# Patient Record
Sex: MALE | Race: WHITE | NOT HISPANIC OR LATINO | Employment: STUDENT | URBAN - METROPOLITAN AREA
[De-identification: names, ages, dates, MRNs, and addresses within clinical notes are randomized per-mention and may not be internally consistent; named-entity substitution may affect disease eponyms.]

---

## 2023-10-02 DIAGNOSIS — F90.2 ATTENTION DEFICIT HYPERACTIVITY DISORDER (ADHD), COMBINED TYPE: ICD-10-CM

## 2023-10-02 RX ORDER — LISDEXAMFETAMINE DIMESYLATE 50 MG/1
50 CAPSULE ORAL EVERY MORNING
Qty: 30 CAPSULE | Refills: 0 | Status: SHIPPED | OUTPATIENT
Start: 2023-10-02 | End: 2023-11-14 | Stop reason: SDUPTHER

## 2023-10-02 RX ORDER — LISDEXAMFETAMINE DIMESYLATE 50 MG/1
50 CAPSULE ORAL EVERY MORNING
COMMUNITY
Start: 2023-10-01 | End: 2023-10-02 | Stop reason: SDUPTHER

## 2023-11-14 DIAGNOSIS — F90.2 ATTENTION DEFICIT HYPERACTIVITY DISORDER (ADHD), COMBINED TYPE: ICD-10-CM

## 2023-11-14 RX ORDER — LISDEXAMFETAMINE DIMESYLATE 50 MG/1
50 CAPSULE ORAL EVERY MORNING
Qty: 30 CAPSULE | Refills: 0 | Status: SHIPPED | OUTPATIENT
Start: 2023-11-14 | End: 2023-11-17 | Stop reason: SDUPTHER

## 2023-11-17 RX ORDER — LISDEXAMFETAMINE DIMESYLATE 50 MG/1
50 CAPSULE ORAL EVERY MORNING
Qty: 30 CAPSULE | Refills: 0 | Status: SHIPPED | OUTPATIENT
Start: 2023-11-17 | End: 2023-11-29

## 2023-11-29 ENCOUNTER — TELEMEDICINE (OUTPATIENT)
Dept: BEHAVIORAL HEALTH | Facility: CLINIC | Age: 23
End: 2023-11-29
Payer: COMMERCIAL

## 2023-11-29 DIAGNOSIS — F41.1 GAD (GENERALIZED ANXIETY DISORDER): ICD-10-CM

## 2023-11-29 DIAGNOSIS — F90.2 ATTENTION DEFICIT HYPERACTIVITY DISORDER (ADHD), COMBINED TYPE: ICD-10-CM

## 2023-11-29 DIAGNOSIS — F42.2 MIXED OBSESSIONAL THOUGHTS AND ACTS: ICD-10-CM

## 2023-11-29 DIAGNOSIS — F33.1 MODERATE EPISODE OF RECURRENT MAJOR DEPRESSIVE DISORDER (MULTI): ICD-10-CM

## 2023-11-29 PROCEDURE — 99214 OFFICE O/P EST MOD 30 MIN: CPT | Performed by: STUDENT IN AN ORGANIZED HEALTH CARE EDUCATION/TRAINING PROGRAM

## 2023-11-29 RX ORDER — ATOMOXETINE 80 MG/1
1 CAPSULE ORAL DAILY
COMMUNITY
Start: 2020-05-11 | End: 2023-11-29 | Stop reason: SDUPTHER

## 2023-11-29 RX ORDER — LISDEXAMFETAMINE DIMESYLATE 50 MG/1
50 CAPSULE ORAL EVERY MORNING
Qty: 30 CAPSULE | Refills: 0 | Status: SHIPPED | OUTPATIENT
Start: 2023-11-29 | End: 2023-11-29 | Stop reason: SDUPTHER

## 2023-11-29 RX ORDER — BUSPIRONE HYDROCHLORIDE 30 MG/1
30 TABLET ORAL 2 TIMES DAILY
COMMUNITY
End: 2023-11-29 | Stop reason: SDUPTHER

## 2023-11-29 RX ORDER — LISDEXAMFETAMINE DIMESYLATE 60 MG/1
60 CAPSULE ORAL EVERY MORNING
Qty: 30 CAPSULE | Refills: 0 | Status: SHIPPED | OUTPATIENT
Start: 2023-11-29 | End: 2023-12-05 | Stop reason: SDUPTHER

## 2023-11-29 RX ORDER — CLONIDINE HYDROCHLORIDE 0.2 MG/1
0.2 TABLET ORAL DAILY
COMMUNITY
End: 2023-11-29 | Stop reason: SDUPTHER

## 2023-11-29 RX ORDER — FLUVOXAMINE MALEATE 100 MG/1
150 TABLET, COATED ORAL 2 TIMES DAILY
Qty: 270 TABLET | Refills: 3 | Status: SHIPPED | OUTPATIENT
Start: 2023-11-29 | End: 2023-12-05 | Stop reason: SDUPTHER

## 2023-11-29 RX ORDER — ATOMOXETINE 80 MG/1
80 CAPSULE ORAL DAILY
Qty: 90 CAPSULE | Refills: 3 | Status: SHIPPED | OUTPATIENT
Start: 2023-11-29 | End: 2024-11-28

## 2023-11-29 RX ORDER — CLONIDINE HYDROCHLORIDE 0.2 MG/1
0.2 TABLET ORAL DAILY
Qty: 90 TABLET | Refills: 3 | Status: SHIPPED | OUTPATIENT
Start: 2023-11-29 | End: 2024-05-22 | Stop reason: SDUPTHER

## 2023-11-29 RX ORDER — BUSPIRONE HYDROCHLORIDE 30 MG/1
30 TABLET ORAL 2 TIMES DAILY
Qty: 180 TABLET | Refills: 3 | Status: SHIPPED | OUTPATIENT
Start: 2023-11-29 | End: 2024-11-28

## 2023-11-29 NOTE — PROGRESS NOTES
"Subjective    All Individuals Present: Patient and Provider (Encounter Provider)     ID: Bola Young is a 23 y.o. male with ADHD, CANDELARIO, OCD, and MDD.     Interval History/HPI/PFSH:  Has final exams next week, and has been difficulty getting Vyvanse d/t national shortage, and has been out for 2 weeks. Stressed by impact this has had during key academic period. Motivation has been particularly difficult. Grades have still been mostly A's.    Started using CPAP and waiting to see full benefit, feels less seasonal depression and mental slowness. Has also used SAD lamp although harder in past 2 weeks to remember without stimulant.     Medication side effects: None Reported     Review of Systems  Constitutional: Negative  Psychiatric: Positive for anxiety, fatigue, school difficulties, and sleep disturbance, Negative for depression, irritability, loss of interest in favorite activities, and mood swings  Neurological: Negative   Other: N/A    Objective   There were no vitals taken for this visit.  Wt Readings from Last 4 Encounters:   03/29/21 78.7 kg (173 lb 6.4 oz)   03/11/20 74.4 kg (164 lb)   02/24/20 34.4 kg (75 lb 12.7 oz)       Mental Status Exam  General Appearance: Well groomed, appropriate eye contact.  Attitude/Behavior: Cooperative, conversant, engaged, and with good eye contact.  Motor: Psychomotor agitation. and more rocking  Speech: Normal rate, volume, prosody  Gait/Station: Within normal limits  Mood: \"hard time motivating\".  Affect: Constricted, Anxious, and Congruent with mood and topic of conversation  Thought Process: Circumstantial  Thought Associations: No loosening of associations  Thought Content: normal  Sensorium: Alert and oriented to person, place, time and situation  Insight: Intact, as evidenced by awareness of symptom patterns  Judgment: Intact  Cognition: Attention: Mild impairment in attention, Fund of Knowledge: Good, Language: Naming intact, Orientation: AOx4, Recent memory: intact, " and Remote Memory: intact  Testing: N/A., see prior neuropsych testing    Laboratory/Imaging/Diagnostic Tests       Assessment/Plan   Overall Formulation and Differential Diagnosis:  Bola Young is a 23 y.o. male who meets criteria for ADHD, CANDELARIO, MDD, and OCD.    Interval Assessment:  In the interim, worsening intrusive thoughts and will cross titrate from sertraline to fluvoxamine. Will resume stimulant cautiously. Now back in school and euthymic, but stress with stimulant shortage. Able to contract for safety.     Plan:  -continue atomoxetine 80 mg PO daily  -increase Vyvanse to 60 mg PO daily, switch to [employer]  --continue clonidine 0.2 mg PO QHS  -continue fluvoxamine 150 mg PO BID  -continue trazodone 100 mg PO QHS prn insomnia  -holding propranolol  -continue buspirone to 30 mg PO BID  -continue working with individual psychotherapist, get IS[facility]  -consider sleep study  -RTC 4-8 weeks    Risk Assessment:  Imminent Risk of Suicide or Serious Self-Injury: Low Risk -- Risk factors include: Depression and Family history of suicide Protective factors include:Denies current suicidal ideation, Denies history of suicide attempts , Future-oriented talk , Willingness to seek help and support , Cultural and Orthodox beliefs that discourage suicide and support self-preservation , Access to a variety of clinical interventions , Support through ongoing medical and mental healthcare relationships , Current/history of good response to treatment/meds , Interpersonal relationships and supports, e.g., family, friends, peers, community , and Restricted access to firearms or other lethal means of suicide   Imminent Risk of Violence or Homicide: Low Risk - Risk factors include: No significant risk factors identified on screening. Protective factors include: Lack of known history of harm to others , Lack of known history of violent ideation , Lack of known access to firearms , Affect regulation , and Positive, pro-social  family/peer network   Treatment Plan:  There are no recently modified care plans to display for this patient.      Attestation Statements   Number of Minutes Spent Performing Evaluation & Management (E&M): 30

## 2023-12-05 DIAGNOSIS — F42.2 MIXED OBSESSIONAL THOUGHTS AND ACTS: ICD-10-CM

## 2023-12-05 DIAGNOSIS — F90.2 ATTENTION DEFICIT HYPERACTIVITY DISORDER (ADHD), COMBINED TYPE: ICD-10-CM

## 2023-12-05 RX ORDER — FLUVOXAMINE MALEATE 100 MG/1
150 TABLET, COATED ORAL 2 TIMES DAILY
Qty: 270 TABLET | Refills: 3 | Status: SHIPPED | OUTPATIENT
Start: 2023-12-05 | End: 2024-12-04

## 2023-12-05 RX ORDER — LISDEXAMFETAMINE DIMESYLATE 30 MG/1
30 CAPSULE ORAL 2 TIMES DAILY
Qty: 60 CAPSULE | Refills: 0 | Status: SHIPPED | OUTPATIENT
Start: 2023-12-05 | End: 2024-01-29 | Stop reason: SDUPTHER

## 2023-12-05 RX ORDER — FLUVOXAMINE MALEATE 100 MG/1
150 TABLET, COATED ORAL 2 TIMES DAILY
Qty: 270 TABLET | Refills: 3 | Status: SHIPPED | OUTPATIENT
Start: 2023-12-05 | End: 2023-12-05 | Stop reason: SDUPTHER

## 2023-12-18 DIAGNOSIS — F33.1 MODERATE EPISODE OF RECURRENT MAJOR DEPRESSIVE DISORDER (MULTI): ICD-10-CM

## 2023-12-19 RX ORDER — TRAZODONE HYDROCHLORIDE 50 MG/1
50 TABLET ORAL NIGHTLY
Qty: 30 TABLET | Refills: 11 | Status: SHIPPED | OUTPATIENT
Start: 2023-12-19 | End: 2024-01-29 | Stop reason: SDUPTHER

## 2023-12-20 ENCOUNTER — TELEPHONE (OUTPATIENT)
Dept: BEHAVIORAL HEALTH | Facility: CLINIC | Age: 23
End: 2023-12-20
Payer: COMMERCIAL

## 2024-01-29 ENCOUNTER — TELEMEDICINE (OUTPATIENT)
Dept: BEHAVIORAL HEALTH | Facility: HOSPITAL | Age: 24
End: 2024-01-29
Payer: COMMERCIAL

## 2024-01-29 DIAGNOSIS — F41.1 GAD (GENERALIZED ANXIETY DISORDER): ICD-10-CM

## 2024-01-29 DIAGNOSIS — F42.2 MIXED OBSESSIONAL THOUGHTS AND ACTS: ICD-10-CM

## 2024-01-29 DIAGNOSIS — F33.1 MODERATE EPISODE OF RECURRENT MAJOR DEPRESSIVE DISORDER (MULTI): ICD-10-CM

## 2024-01-29 DIAGNOSIS — F90.2 ATTENTION DEFICIT HYPERACTIVITY DISORDER (ADHD), COMBINED TYPE: ICD-10-CM

## 2024-01-29 PROCEDURE — 99214 OFFICE O/P EST MOD 30 MIN: CPT | Performed by: STUDENT IN AN ORGANIZED HEALTH CARE EDUCATION/TRAINING PROGRAM

## 2024-01-29 RX ORDER — TRAZODONE HYDROCHLORIDE 100 MG/1
100-200 TABLET ORAL NIGHTLY
Qty: 180 TABLET | Refills: 3 | Status: SHIPPED | OUTPATIENT
Start: 2024-01-29 | End: 2024-03-28

## 2024-01-29 RX ORDER — LISDEXAMFETAMINE DIMESYLATE 60 MG/1
60 CAPSULE ORAL EVERY MORNING
Qty: 30 CAPSULE | Refills: 0 | Status: SHIPPED | OUTPATIENT
Start: 2024-01-29 | End: 2024-05-22 | Stop reason: SDUPTHER

## 2024-01-29 NOTE — PROGRESS NOTES
"Subjective    All Individuals Present: Patient and Provider (Encounter Provider)     ID: Bola Young is a 23 y.o. male with ADHD, CANDELARIO, OCD, and MDD.     Interval History/HPI/PFSH:    Sleep still problematic, has 1-2 nights per week where struggling to fall asleep. Still uses CPAP.    School going so-so this semester; finished last semester with straight A's. Has been smoking more marijuana. Trying to cut back,    Starts DBT group in February in addition to individual.     Medication side effects: None Reported     Review of Systems  Constitutional: Negative  Psychiatric: Positive for anxiety, fatigue, school difficulties, and sleep disturbance, Negative for depression, irritability, loss of interest in favorite activities, and mood swings  Neurological: Negative   Other: N/A    Objective   There were no vitals taken for this visit.  Wt Readings from Last 4 Encounters:   03/29/21 78.7 kg (173 lb 6.4 oz)   03/11/20 74.4 kg (164 lb)   02/24/20 34.4 kg (75 lb 12.7 oz)       Mental Status Exam  General Appearance: Well groomed, appropriate eye contact.  Attitude/Behavior: Cooperative, conversant, engaged, and with good eye contact.  Motor: Psychomotor agitation. and more rocking  Speech: Normal rate, volume, prosody  Gait/Station: Within normal limits  Mood: \"so so\".  Affect: Constricted, Anxious, and Congruent with mood and topic of conversation  Thought Process: Circumstantial  Thought Associations: No loosening of associations  Thought Content: normal  Sensorium: Alert and oriented to person, place, time and situation  Insight: Intact, as evidenced by awareness of symptom patterns  Judgment:  Some limitations re: increased cannabis usage  Cognition: Attention: Mild impairment in attention, Fund of Knowledge: Good, Language: Naming intact, Orientation: AOx4, Recent memory: intact, and Remote Memory: intact  Testing: N/A., see prior neuropsych testing    Laboratory/Imaging/Diagnostic Tests       Assessment/Plan "   Overall Formulation and Differential Diagnosis:  Bola Young is a 23 y.o. male who meets criteria for ADHD, CANDELARIO, MDD, and OCD.    Interval Assessment:  In the interim, worsening intrusive thoughts and will cross titrate from sertraline to fluvoxamine. Will resume stimulant cautiously. Now back in school and euthymic. Will focus on sleep. Able to contract for safety.     Plan:  -continue atomoxetine 80 mg PO daily  -continue Vyvanse to 60 mg PO daily  --continue clonidine 0.2 mg PO QHS  -continue fluvoxamine 150 mg PO BID  -increase trazodone to 100-200 mg PO QHS prn insomnia; consider increase in future if benefits depression  -continue CPAP  -holding propranolol  -continue buspirone to 30 mg PO BID  -continue working with individual psychotherapist  -RTC 4-8 weeks    Risk Assessment:  Imminent Risk of Suicide or Serious Self-Injury: Low Risk -- Risk factors include: Depression and Family history of suicide Protective factors include:Denies current suicidal ideation, Denies history of suicide attempts , Future-oriented talk , Willingness to seek help and support , Cultural and Latter day beliefs that discourage suicide and support self-preservation , Access to a variety of clinical interventions , Support through ongoing medical and mental healthcare relationships , Current/history of good response to treatment/meds , Interpersonal relationships and supports, e.g., family, friends, peers, community , and Restricted access to firearms or other lethal means of suicide   Imminent Risk of Violence or Homicide: Low Risk - Risk factors include: No significant risk factors identified on screening. Protective factors include: Lack of known history of harm to others , Lack of known history of violent ideation , Lack of known access to firearms , Affect regulation , and Positive, pro-social family/peer network   Treatment Plan:  There are no recently modified care plans to display for this patient.      Attestation  Statements   Number of Minutes Spent Performing Evaluation & Management (E&M): 30

## 2024-03-20 ENCOUNTER — TELEMEDICINE (OUTPATIENT)
Dept: BEHAVIORAL HEALTH | Facility: CLINIC | Age: 24
End: 2024-03-20
Payer: COMMERCIAL

## 2024-03-20 DIAGNOSIS — F42.2 MIXED OBSESSIONAL THOUGHTS AND ACTS: ICD-10-CM

## 2024-03-20 DIAGNOSIS — F90.2 ATTENTION DEFICIT HYPERACTIVITY DISORDER (ADHD), COMBINED TYPE: ICD-10-CM

## 2024-03-20 DIAGNOSIS — F41.1 GAD (GENERALIZED ANXIETY DISORDER): ICD-10-CM

## 2024-03-20 DIAGNOSIS — F33.1 MODERATE EPISODE OF RECURRENT MAJOR DEPRESSIVE DISORDER (MULTI): ICD-10-CM

## 2024-03-20 PROCEDURE — 99214 OFFICE O/P EST MOD 30 MIN: CPT | Performed by: STUDENT IN AN ORGANIZED HEALTH CARE EDUCATION/TRAINING PROGRAM

## 2024-03-20 RX ORDER — LISDEXAMFETAMINE DIMESYLATE 30 MG/1
30 CAPSULE ORAL 2 TIMES DAILY
Qty: 60 CAPSULE | Refills: 0 | Status: SHIPPED | OUTPATIENT
Start: 2024-04-19 | End: 2024-05-19

## 2024-03-20 RX ORDER — LISDEXAMFETAMINE DIMESYLATE 30 MG/1
30 CAPSULE ORAL 2 TIMES DAILY
Qty: 60 CAPSULE | Refills: 0 | Status: SHIPPED | OUTPATIENT
Start: 2024-03-20 | End: 2024-03-29 | Stop reason: SDUPTHER

## 2024-03-20 RX ORDER — LISDEXAMFETAMINE DIMESYLATE 30 MG/1
30 CAPSULE ORAL 2 TIMES DAILY
Qty: 60 CAPSULE | Refills: 0 | Status: SHIPPED | OUTPATIENT
Start: 2024-05-19 | End: 2024-06-18

## 2024-03-20 NOTE — PROGRESS NOTES
"Subjective    All Individuals Present: Patient and Provider (Encounter Provider)     ID: Bola Young is a 23 y.o. male with ADHD, CANDELARIO, OCD, and MDD.     Interval History/HPI/PFSH:    Had depressing spring break, was bored and frustrated with continued intrusive thoughts. Having a hard time not being so resistant to activities from therapy. Thinks that cannabis was likely exacerbating some of this and so taking a current break.    Working in therapy on resistance to change.    Denies SI, HI, AVH.     Medication side effects: None Reported     Review of Systems  Constitutional: Negative  Psychiatric: Positive for anxiety, fatigue, school difficulties, and sleep disturbance, Negative for depression, irritability, loss of interest in favorite activities, and mood swings  Neurological: Negative   Other: N/A    Objective   There were no vitals taken for this visit.  Wt Readings from Last 4 Encounters:   03/29/21 78.7 kg (173 lb 6.4 oz)   03/11/20 74.4 kg (164 lb)   02/24/20 34.4 kg (75 lb 12.7 oz)       Mental Status Exam  General Appearance: Well groomed, appropriate eye contact.  Attitude/Behavior: Cooperative, conversant, engaged, and with good eye contact.  Motor: Psychomotor agitation. and more rocking  Speech: Normal rate, volume, prosody  Gait/Station: Within normal limits  Mood: \"a little more depressed\"  Affect: Constricted, Anxious, and Congruent with mood and topic of conversation  Thought Process: Circumstantial  Thought Associations: No loosening of associations  Thought Content: normal  Sensorium: Alert and oriented to person, place, time and situation  Insight: Intact, as evidenced by awareness of symptom patterns  Judgment:  Some limitations re: increased cannabis usage  Cognition: Attention: Mild impairment in attention, Fund of Knowledge: Good, Language: Naming intact, Orientation: AOx4, Recent memory: intact, and Remote Memory: intact  Testing: N/A., see prior neuropsych " testing    Laboratory/Imaging/Diagnostic Tests       Assessment/Plan   Overall Formulation and Differential Diagnosis:  Bola Young is a 23 y.o. male who meets criteria for ADHD, CANDELARIO, MDD, and OCD.    Interval Assessment:  In the interim, worsening intrusive thoughts and will cross titrate from sertraline to fluvoxamine. Will resume stimulant cautiously. Now back in school, some increased depression with cannabis, now abstaining. Able to contract for safety.     Plan:  -continue atomoxetine 80 mg PO daily  -continue Vyvanse to 60 mg PO daily  --continue clonidine 0.2 mg PO QHS  -continue fluvoxamine 150 mg PO BID  -continue trazodone to 100-200 mg PO QHS prn insomnia; consider increase in future if benefits depression  -continue CPAP  -holding propranolol  -continue buspirone to 30 mg PO BID  -continue working with individual psychotherapist  -RTC 4-8 weeks    Risk Assessment:  Imminent Risk of Suicide or Serious Self-Injury: Low Risk -- Risk factors include: Depression and Family history of suicide Protective factors include:Denies current suicidal ideation, Denies history of suicide attempts , Future-oriented talk , Willingness to seek help and support , Cultural and Gnosticism beliefs that discourage suicide and support self-preservation , Access to a variety of clinical interventions , Support through ongoing medical and mental healthcare relationships , Current/history of good response to treatment/meds , Interpersonal relationships and supports, e.g., family, friends, peers, community , and Restricted access to firearms or other lethal means of suicide   Imminent Risk of Violence or Homicide: Low Risk - Risk factors include: No significant risk factors identified on screening. Protective factors include: Lack of known history of harm to others , Lack of known history of violent ideation , Lack of known access to firearms , Affect regulation , and Positive, pro-social family/peer network   Treatment  Plan:  There are no recently modified care plans to display for this patient.      Attestation Statements   Number of Minutes Spent Performing Evaluation & Management (E&M): 30

## 2024-03-28 RX ORDER — TRAZODONE HYDROCHLORIDE 100 MG/1
100 TABLET ORAL NIGHTLY
Qty: 90 TABLET | Refills: 3 | Status: SHIPPED | OUTPATIENT
Start: 2024-03-28 | End: 2025-03-28

## 2024-03-29 DIAGNOSIS — F90.2 ATTENTION DEFICIT HYPERACTIVITY DISORDER (ADHD), COMBINED TYPE: ICD-10-CM

## 2024-03-29 RX ORDER — LISDEXAMFETAMINE DIMESYLATE 30 MG/1
30 CAPSULE ORAL 2 TIMES DAILY
Qty: 60 CAPSULE | Refills: 0 | Status: SHIPPED | OUTPATIENT
Start: 2024-03-29 | End: 2024-04-28

## 2024-05-01 ENCOUNTER — APPOINTMENT (OUTPATIENT)
Dept: BEHAVIORAL HEALTH | Facility: CLINIC | Age: 24
End: 2024-05-01
Payer: COMMERCIAL

## 2024-05-22 ENCOUNTER — TELEMEDICINE (OUTPATIENT)
Dept: BEHAVIORAL HEALTH | Facility: CLINIC | Age: 24
End: 2024-05-22
Payer: COMMERCIAL

## 2024-05-22 DIAGNOSIS — F42.2 MIXED OBSESSIONAL THOUGHTS AND ACTS: ICD-10-CM

## 2024-05-22 DIAGNOSIS — F33.1 MODERATE EPISODE OF RECURRENT MAJOR DEPRESSIVE DISORDER (MULTI): ICD-10-CM

## 2024-05-22 DIAGNOSIS — F41.1 GAD (GENERALIZED ANXIETY DISORDER): ICD-10-CM

## 2024-05-22 DIAGNOSIS — F90.2 ATTENTION DEFICIT HYPERACTIVITY DISORDER (ADHD), COMBINED TYPE: ICD-10-CM

## 2024-05-22 PROCEDURE — 99214 OFFICE O/P EST MOD 30 MIN: CPT | Performed by: STUDENT IN AN ORGANIZED HEALTH CARE EDUCATION/TRAINING PROGRAM

## 2024-05-22 RX ORDER — CLONIDINE HYDROCHLORIDE 0.2 MG/1
0.2 TABLET ORAL DAILY
Qty: 90 TABLET | Refills: 3 | Status: SHIPPED | OUTPATIENT
Start: 2024-05-22 | End: 2025-05-22

## 2024-05-22 RX ORDER — LISDEXAMFETAMINE DIMESYLATE 60 MG/1
60 CAPSULE ORAL EVERY MORNING
Qty: 30 CAPSULE | Refills: 0 | Status: SHIPPED | OUTPATIENT
Start: 2024-05-22 | End: 2024-06-21

## 2024-05-22 NOTE — PROGRESS NOTES
"Subjective    All Individuals Present: Patient and Provider (Encounter Provider)     ID: Bola Young is a 23 y.o. male with ADHD, CANDELARIO, OCD, and MDD.     Interval History/HPI/PFSH:    Will be switching therapists to someone who specializes more with DBT, waiting for therapist to find this provider. Feels like things had gotten stagnant.    Doing well in school.    Trying to get a job for the summer. Goes back to school mid-August.    Trying to socialize more, get more into music.    Denies SI, HI, AVH.     Medication side effects: None Reported     Review of Systems  Constitutional: Negative  Psychiatric: Positive for anxiety, fatigue, school difficulties, and sleep disturbance, Negative for depression, irritability, loss of interest in favorite activities, and mood swings  Neurological: Negative   Other: N/A    Objective   There were no vitals taken for this visit.  Wt Readings from Last 4 Encounters:   03/29/21 78.7 kg (173 lb 6.4 oz)   03/11/20 74.4 kg (164 lb)   02/24/20 34.4 kg (75 lb 12.7 oz)       Mental Status Exam  General Appearance: Well groomed, appropriate eye contact.  Attitude/Behavior: Cooperative, conversant, engaged, and with good eye contact.  Motor: Psychomotor agitation. and more rocking  Speech: Normal rate, volume, prosody  Gait/Station: Within normal limits  Mood: \"okay\"  Affect: Constricted, Anxious, and Congruent with mood and topic of conversation  Thought Process: Circumstantial  Thought Associations: No loosening of associations  Thought Content: normal  Sensorium: Alert and oriented to person, place, time and situation  Insight: Intact, as evidenced by awareness of symptom patterns  Judgment:  Some limitations re: increased cannabis usage  Cognition: Attention: Mild impairment in attention, Fund of Knowledge: Good, Language: Naming intact, Orientation: AOx4, Recent memory: intact, and Remote Memory: intact  Testing: N/A., see prior neuropsych testing    Laboratory/Imaging/Diagnostic " Tests       Assessment/Plan   Overall Formulation and Differential Diagnosis:  Bola Young is a 23 y.o. male who meets criteria for ADHD, CANDELARIO, MDD, and OCD.    Interval Assessment:  In the interim, worsening intrusive thoughts and will cross titrate from sertraline to fluvoxamine. Will resume stimulant cautiously. Now back in school, some increased depression with cannabis, now abstaining. Able to contract for safety.     Plan:  -continue atomoxetine 80 mg PO daily  -continue Vyvanse to 60 mg PO daily  --continue clonidine 0.2 mg PO QHS  -continue fluvoxamine 150 mg PO BID  -continue trazodone to 100-200 mg PO QHS prn insomnia; consider increase in future if benefits depression  -continue CPAP  -holding propranolol  -continue buspirone to 30 mg PO BID  -continue working with individual psychotherapist, working on finding new DBT therapist  -RTC 4-8 weeks    Risk Assessment:  Imminent Risk of Suicide or Serious Self-Injury: Low Risk -- Risk factors include: Depression and Family history of suicide Protective factors include:Denies current suicidal ideation, Denies history of suicide attempts , Future-oriented talk , Willingness to seek help and support , Cultural and Jainism beliefs that discourage suicide and support self-preservation , Access to a variety of clinical interventions , Support through ongoing medical and mental healthcare relationships , Current/history of good response to treatment/meds , Interpersonal relationships and supports, e.g., family, friends, peers, community , and Restricted access to firearms or other lethal means of suicide   Imminent Risk of Violence or Homicide: Low Risk - Risk factors include: No significant risk factors identified on screening. Protective factors include: Lack of known history of harm to others , Lack of known history of violent ideation , Lack of known access to firearms , Affect regulation , and Positive, pro-social family/peer network   Treatment  Plan:  There are no recently modified care plans to display for this patient.      Attestation Statements   Number of Minutes Spent Performing Evaluation & Management (E&M): 30

## 2024-06-20 RX ORDER — SERTRALINE HYDROCHLORIDE 100 MG/1
TABLET, FILM COATED ORAL
Refills: 0 | OUTPATIENT
Start: 2024-06-20

## 2024-06-20 RX ORDER — FLUVOXAMINE MALEATE 50 MG/1
TABLET, FILM COATED ORAL
Refills: 0 | OUTPATIENT
Start: 2024-06-20

## 2024-07-03 ENCOUNTER — APPOINTMENT (OUTPATIENT)
Dept: BEHAVIORAL HEALTH | Facility: CLINIC | Age: 24
End: 2024-07-03
Payer: COMMERCIAL

## 2024-08-23 ENCOUNTER — APPOINTMENT (OUTPATIENT)
Dept: BEHAVIORAL HEALTH | Facility: CLINIC | Age: 24
End: 2024-08-23
Payer: COMMERCIAL

## 2024-08-23 DIAGNOSIS — F42.2 MIXED OBSESSIONAL THOUGHTS AND ACTS: ICD-10-CM

## 2024-08-23 DIAGNOSIS — F33.1 MODERATE EPISODE OF RECURRENT MAJOR DEPRESSIVE DISORDER (MULTI): ICD-10-CM

## 2024-08-23 DIAGNOSIS — F90.2 ATTENTION DEFICIT HYPERACTIVITY DISORDER (ADHD), COMBINED TYPE: ICD-10-CM

## 2024-08-23 DIAGNOSIS — F41.1 GAD (GENERALIZED ANXIETY DISORDER): ICD-10-CM

## 2024-08-23 PROCEDURE — 99214 OFFICE O/P EST MOD 30 MIN: CPT | Performed by: STUDENT IN AN ORGANIZED HEALTH CARE EDUCATION/TRAINING PROGRAM

## 2024-08-23 RX ORDER — TRAZODONE HYDROCHLORIDE 100 MG/1
200 TABLET ORAL NIGHTLY
Qty: 180 TABLET | Refills: 3 | Status: SHIPPED | OUTPATIENT
Start: 2024-08-23 | End: 2025-08-23

## 2024-08-23 RX ORDER — LISDEXAMFETAMINE DIMESYLATE 30 MG/1
30 CAPSULE ORAL 2 TIMES DAILY
Qty: 60 CAPSULE | Refills: 0 | Status: SHIPPED | OUTPATIENT
Start: 2024-10-22 | End: 2024-11-21

## 2024-08-23 RX ORDER — LISDEXAMFETAMINE DIMESYLATE 30 MG/1
30 CAPSULE ORAL 2 TIMES DAILY
Qty: 60 CAPSULE | Refills: 0 | Status: SHIPPED | OUTPATIENT
Start: 2024-09-22 | End: 2024-10-22

## 2024-08-23 RX ORDER — LISDEXAMFETAMINE DIMESYLATE 30 MG/1
30 CAPSULE ORAL 2 TIMES DAILY
Qty: 60 CAPSULE | Refills: 0 | Status: SHIPPED | OUTPATIENT
Start: 2024-08-23 | End: 2024-09-22

## 2024-08-23 NOTE — PROGRESS NOTES
"Subjective    All Individuals Present: Patient and Provider (Encounter Provider)     ID: Bola Young is a 24 y.o. male with ADHD, CANDELARIO, OCD, and MDD.     Interval History/HPI/PFSH:    Now back in school, just celebrated his 24th birthday. Doing 3 classes this semster, hopefully graduate 2026. 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    Saw sleep medicine a month ago, they recommended increasing trazodone to 200 mg.    Just picked his new therapist after searching for them with prior therapist, will start in 2 weeks. Will wrap up with prior therapist next week.    Denies SI, HI, AVH.     Medication side effects: None Reported     Review of Systems  Constitutional: Negative  Psychiatric: Positive for anxiety, fatigue, school difficulties, and sleep disturbance, Negative for depression, irritability, loss of interest in favorite activities, and mood swings  Neurological: Negative   Other: N/A    Objective   There were no vitals taken for this visit.  Wt Readings from Last 4 Encounters:   03/29/21 78.7 kg (173 lb 6.4 oz)   03/11/20 74.4 kg (164 lb)   02/24/20 34.4 kg (75 lb 12.7 oz)       Mental Status Exam  General Appearance: Well groomed, appropriate eye contact.  Attitude/Behavior: Cooperative, conversant, engaged, and with good eye contact.  Motor: Psychomotor agitation. and more rocking  Speech: Normal rate, volume, prosody  Gait/Station: Within normal limits  Mood: \"okay\"  Affect: Constricted, Anxious, and Congruent with mood and topic of conversation  Thought Process: Circumstantial  Thought Associations: No loosening of associations  Thought Content: normal  Sensorium: Alert and oriented to person, place, time and situation  Insight: Intact, as evidenced by awareness of symptom patterns  Judgment:  Some limitations re: increased cannabis usage  Cognition: Attention: Mild impairment in attention, Fund of Knowledge: Good, Language: Naming intact, Orientation: AOx4, Recent memory: intact, and Remote Memory: " intact  Testing: N/A., see prior neuropsych testing    Laboratory/Imaging/Diagnostic Tests       Assessment/Plan   Overall Formulation and Differential Diagnosis:  Bola Young is a 24 y.o. male who meets criteria for ADHD, CANDELARIO, MDD, and OCD.    Interval Assessment:  In the interim, worsening intrusive thoughts and will cross titrate from sertraline to fluvoxamine. Will resume stimulant cautiously. Now back in school, some increased depression with cannabis, now abstaining. Able to contract for safety.     Plan:  -continue atomoxetine 80 mg PO daily  -continue Vyvanse to 60 mg PO daily  --continue clonidine 0.2 mg PO QHS  -continue fluvoxamine 150 mg PO BID  -continue trazodone to 100-200 mg PO QHS prn insomnia; consider increase in future if benefits depression  -continue CPAP  -holding propranolol  -continue buspirone to 30 mg PO BID  -continue working with individual psychotherapist, working on finding new DBT therapist  -RTC 4-8 weeks    Risk Assessment:  Imminent Risk of Suicide or Serious Self-Injury: Low Risk -- Risk factors include: Depression and Family history of suicide Protective factors include:Denies current suicidal ideation, Denies history of suicide attempts , Future-oriented talk , Willingness to seek help and support , Cultural and Taoist beliefs that discourage suicide and support self-preservation , Access to a variety of clinical interventions , Support through ongoing medical and mental healthcare relationships , Current/history of good response to treatment/meds , Interpersonal relationships and supports, e.g., family, friends, peers, community , and Restricted access to firearms or other lethal means of suicide   Imminent Risk of Violence or Homicide: Low Risk - Risk factors include: No significant risk factors identified on screening. Protective factors include: Lack of known history of harm to others , Lack of known history of violent ideation , Lack of known access to firearms ,  Affect regulation , and Positive, pro-social family/peer network   Treatment Plan:  There are no recently modified care plans to display for this patient.      Attestation Statements   Number of Minutes Spent Performing Evaluation & Management (E&M): 30

## 2024-10-18 ENCOUNTER — APPOINTMENT (OUTPATIENT)
Dept: BEHAVIORAL HEALTH | Facility: CLINIC | Age: 24
End: 2024-10-18
Payer: COMMERCIAL

## 2024-10-18 DIAGNOSIS — F41.1 GAD (GENERALIZED ANXIETY DISORDER): ICD-10-CM

## 2024-10-18 DIAGNOSIS — F33.1 MODERATE EPISODE OF RECURRENT MAJOR DEPRESSIVE DISORDER: ICD-10-CM

## 2024-10-18 DIAGNOSIS — F90.2 ATTENTION DEFICIT HYPERACTIVITY DISORDER (ADHD), COMBINED TYPE: ICD-10-CM

## 2024-10-18 DIAGNOSIS — F42.2 MIXED OBSESSIONAL THOUGHTS AND ACTS: ICD-10-CM

## 2024-10-18 PROCEDURE — 99214 OFFICE O/P EST MOD 30 MIN: CPT | Performed by: STUDENT IN AN ORGANIZED HEALTH CARE EDUCATION/TRAINING PROGRAM

## 2024-10-18 RX ORDER — CLONIDINE HYDROCHLORIDE 0.2 MG/1
0.2 TABLET ORAL DAILY
Qty: 90 TABLET | Refills: 3 | Status: SHIPPED | OUTPATIENT
Start: 2024-10-18 | End: 2025-10-18

## 2024-10-18 RX ORDER — ATOMOXETINE 80 MG/1
80 CAPSULE ORAL DAILY
Qty: 90 CAPSULE | Refills: 3 | Status: SHIPPED | OUTPATIENT
Start: 2024-10-18 | End: 2025-10-18

## 2024-10-18 RX ORDER — LISDEXAMFETAMINE DIMESYLATE 30 MG/1
30 CAPSULE ORAL 2 TIMES DAILY
Qty: 60 CAPSULE | Refills: 0 | Status: SHIPPED | OUTPATIENT
Start: 2024-11-21 | End: 2024-12-21

## 2024-10-18 RX ORDER — TRAZODONE HYDROCHLORIDE 300 MG/1
300 TABLET ORAL NIGHTLY
Qty: 90 TABLET | Refills: 3 | Status: SHIPPED | OUTPATIENT
Start: 2024-10-18 | End: 2025-10-18

## 2024-10-18 RX ORDER — FLUVOXAMINE MALEATE 100 MG/1
TABLET, COATED ORAL
Qty: 270 TABLET | Refills: 3 | Status: SHIPPED | OUTPATIENT
Start: 2024-10-18 | End: 2025-10-18

## 2024-10-18 RX ORDER — BUSPIRONE HYDROCHLORIDE 30 MG/1
30 TABLET ORAL 2 TIMES DAILY
Qty: 180 TABLET | Refills: 3 | Status: SHIPPED | OUTPATIENT
Start: 2024-10-18 | End: 2025-10-18

## 2024-10-18 NOTE — PROGRESS NOTES
"Subjective    All Individuals Present: Patient and Provider (Encounter Provider)     ID: Bola Young is a 24 y.o. male with ADHD, CANDELARIO, OCD, and MDD.     Interval History/HPI/PFSH:    Not thrilled about the months getting colder.    Wondering about insurance changes when he turns 25.    Consistently taking trazodone 200 mg, no sleepless nights in a while.    Mood has been okay but interested in optimizing antidepressants still.    Denies SI, HI, AVH.     Medication side effects:  bruxism     Review of Systems  Constitutional: Negative  Psychiatric: Positive for anxiety, fatigue, school difficulties, and sleep disturbance, Negative for depression, irritability, loss of interest in favorite activities, and mood swings  Neurological: Negative   Other: N/A    Objective   There were no vitals taken for this visit.  Wt Readings from Last 4 Encounters:   03/29/21 78.7 kg (173 lb 6.4 oz)   03/11/20 74.4 kg (164 lb)   02/24/20 34.4 kg (75 lb 12.7 oz)       Mental Status Exam  General Appearance: Well groomed, appropriate eye contact.  Attitude/Behavior: Cooperative, conversant, engaged, and with good eye contact.  Motor: Psychomotor agitation. and more rocking  Speech: Normal rate, volume, prosody  Gait/Station: Within normal limits  Mood: \"pretty good\"  Affect: Constricted, Anxious, and Congruent with mood and topic of conversation  Thought Process: Circumstantial  Thought Associations: No loosening of associations  Thought Content: normal  Sensorium: Alert and oriented to person, place, time and situation  Insight: Intact, as evidenced by awareness of symptom patterns  Judgment:  Some limitations re: increased cannabis usage  Cognition: Attention: Mild impairment in attention, Fund of Knowledge: Good, Language: Naming intact, Orientation: AOx4, Recent memory: intact, and Remote Memory: intact  Testing: N/A., see prior neuropsych testing    Laboratory/Imaging/Diagnostic Tests       Assessment/Plan   Overall Formulation " and Differential Diagnosis:  Bola Young is a 24 y.o. male who meets criteria for ADHD, CANDELARIO, MDD, and OCD.    Interval Assessment:  In the interim, worsening intrusive thoughts and will cross titrate from sertraline to fluvoxamine. Will resume stimulant cautiously. Now back in school, some increased depression with cannabis, now abstaining. Able to contract for safety.     Plan:  -continue atomoxetine 80 mg PO daily  -continue Vyvanse to 60 mg PO daily  --continue clonidine 0.2 mg PO QHS  -continue fluvoxamine 150 mg PO BID  -increase trazodone to 300 mg PO QHS prn insomnia; consider increase in future if benefits depression  -continue CPAP  -holding propranolol  -continue buspirone to 30 mg PO BID  -continue working with individual psychotherapist, working on finding new DBT therapist  -RTC 4-8 weeks    Risk Assessment:  Imminent Risk of Suicide or Serious Self-Injury: Low Risk -- Risk factors include: Depression and Family history of suicide Protective factors include:Denies current suicidal ideation, Denies history of suicide attempts , Future-oriented talk , Willingness to seek help and support , Cultural and Anglican beliefs that discourage suicide and support self-preservation , Access to a variety of clinical interventions , Support through ongoing medical and mental healthcare relationships , Current/history of good response to treatment/meds , Interpersonal relationships and supports, e.g., family, friends, peers, community , and Restricted access to firearms or other lethal means of suicide   Imminent Risk of Violence or Homicide: Low Risk - Risk factors include: No significant risk factors identified on screening. Protective factors include: Lack of known history of harm to others , Lack of known history of violent ideation , Lack of known access to firearms , Affect regulation , and Positive, pro-social family/peer network   Treatment Plan:  There are no recently modified care plans to display for  this patient.      Attestation Statements   Number of Minutes Spent Performing Evaluation & Management (E&M): 30

## 2024-11-22 ENCOUNTER — APPOINTMENT (OUTPATIENT)
Dept: BEHAVIORAL HEALTH | Facility: CLINIC | Age: 24
End: 2024-11-22
Payer: COMMERCIAL

## 2024-12-13 ENCOUNTER — APPOINTMENT (OUTPATIENT)
Dept: BEHAVIORAL HEALTH | Facility: CLINIC | Age: 24
End: 2024-12-13
Payer: COMMERCIAL

## 2024-12-13 DIAGNOSIS — F90.2 ATTENTION DEFICIT HYPERACTIVITY DISORDER (ADHD), COMBINED TYPE: ICD-10-CM

## 2024-12-13 DIAGNOSIS — F41.1 GAD (GENERALIZED ANXIETY DISORDER): ICD-10-CM

## 2024-12-13 DIAGNOSIS — F42.2 MIXED OBSESSIONAL THOUGHTS AND ACTS: ICD-10-CM

## 2024-12-13 DIAGNOSIS — F33.1 MODERATE EPISODE OF RECURRENT MAJOR DEPRESSIVE DISORDER: ICD-10-CM

## 2024-12-13 PROCEDURE — 99214 OFFICE O/P EST MOD 30 MIN: CPT | Performed by: STUDENT IN AN ORGANIZED HEALTH CARE EDUCATION/TRAINING PROGRAM

## 2024-12-13 RX ORDER — LISDEXAMFETAMINE DIMESYLATE 30 MG/1
30 CAPSULE ORAL 2 TIMES DAILY
Qty: 60 CAPSULE | Refills: 0 | Status: SHIPPED | OUTPATIENT
Start: 2025-01-20 | End: 2025-02-19

## 2024-12-13 RX ORDER — LISDEXAMFETAMINE DIMESYLATE 30 MG/1
30 CAPSULE ORAL 2 TIMES DAILY
Qty: 60 CAPSULE | Refills: 0 | Status: SHIPPED | OUTPATIENT
Start: 2024-12-21 | End: 2025-01-20

## 2024-12-13 NOTE — PROGRESS NOTES
"Subjective    All Individuals Present: Patient and Provider (Encounter Provider)     ID: Bola Young is a 24 y.o. male with ADHD, CANDELARIO, OCD, and MDD.     Interval History/HPI/PFSH:    Just finished the school semester, did well in physics and calculus. Anxiety throughout the semester had been worse, often felt doom/dread even for things that in retrospect were not difficult. Some perfectionism and impatience with not getting things right away.    Started with new therapist 3 months ago.    Has only been taking fluvoxamine 200 mg, would like to resume 300 mg.    Denies SI/HI/AVH.      *Not thrilled about the months getting colder.    Wondering about insurance changes when he turns 25.    Consistently taking trazodone 200 mg, no sleepless nights in a while.    Mood has been okay but interested in optimizing antidepressants still.    Denies SI, HI, AVH.     Medication side effects:  bruxism     Review of Systems  Constitutional: Negative  Psychiatric: Positive for anxiety, fatigue, school difficulties, and sleep disturbance, Negative for depression, irritability, loss of interest in favorite activities, and mood swings  Neurological: Negative   Other: N/A    Objective   There were no vitals taken for this visit.  Wt Readings from Last 4 Encounters:   03/29/21 78.7 kg (173 lb 6.4 oz)   03/11/20 74.4 kg (164 lb)   02/24/20 34.4 kg (75 lb 12.7 oz)       Mental Status Exam  General Appearance: Well groomed, appropriate eye contact.  Attitude/Behavior: Cooperative, conversant, engaged, and with good eye contact.  Motor: Psychomotor agitation. and more rocking  Speech: Normal rate, volume, prosody  Gait/Station: Within normal limits  Mood: \"more anxious\"  Affect: Constricted, Anxious, and Congruent with mood and topic of conversation  Thought Process: Circumstantial  Thought Associations: No loosening of associations  Thought Content: normal  Sensorium: Alert and oriented to person, place, time and situation  Insight: " Intact, as evidenced by awareness of symptom patterns  Judgment:  Some limitations re: increased cannabis usage  Cognition: Attention: Mild impairment in attention, Fund of Knowledge: Good, Language: Naming intact, Orientation: AOx4, Recent memory: intact, and Remote Memory: intact  Testing: N/A., see prior neuropsych testing    Laboratory/Imaging/Diagnostic Tests       Assessment/Plan   Overall Formulation and Differential Diagnosis:  Bola Young is a 24 y.o. male who meets criteria for ADHD, CANDELARIO, MDD, and OCD.    Interval Assessment:  In the interim, worsening intrusive thoughts and will cross titrate from sertraline to fluvoxamine. Will resume stimulant cautiously. Now back in school, some increased depression with cannabis, now abstaining. Able to contract for safety.     Plan:  -continue atomoxetine 80 mg PO daily  -continue Vyvanse to 60 mg PO daily  --increase clonidine to 0.3 mg PO QHS  -resume fluvoxamine 150 mg PO BID  -reduce trazodone to 200 mg PO QHS prn insomnia; consider increase in future if benefits depression  -continue CPAP  -holding propranolol  -continue buspirone to 30 mg PO BID  -continue working with individual psychotherapist, working on finding new DBT therapist  -use SAD lamp  -RTC 4-8 weeks    Risk Assessment:  Imminent Risk of Suicide or Serious Self-Injury: Low Risk -- Risk factors include: Depression and Family history of suicide Protective factors include:Denies current suicidal ideation, Denies history of suicide attempts , Future-oriented talk , Willingness to seek help and support , Cultural and Taoist beliefs that discourage suicide and support self-preservation , Access to a variety of clinical interventions , Support through ongoing medical and mental healthcare relationships , Current/history of good response to treatment/meds , Interpersonal relationships and supports, e.g., family, friends, peers, community , and Restricted access to firearms or other lethal means of  suicide   Imminent Risk of Violence or Homicide: Low Risk - Risk factors include: No significant risk factors identified on screening. Protective factors include: Lack of known history of harm to others , Lack of known history of violent ideation , Lack of known access to firearms , Affect regulation , and Positive, pro-social family/peer network   Treatment Plan:  There are no recently modified care plans to display for this patient.      Attestation Statements   Topics (in addition those noted above): Diagnostic impressions, Importance of adherence to treatment , Instructions for treatment , Patient education , Risks and benefits of treatments , Risk factor reduction , and Side effects of medications

## 2025-02-13 DIAGNOSIS — F42.2 MIXED OBSESSIONAL THOUGHTS AND ACTS: ICD-10-CM

## 2025-02-14 RX ORDER — FLUVOXAMINE MALEATE 100 MG/1
150 TABLET, COATED ORAL 2 TIMES DAILY
Qty: 270 TABLET | Refills: 3 | Status: SHIPPED | OUTPATIENT
Start: 2025-02-14

## 2025-02-21 ENCOUNTER — APPOINTMENT (OUTPATIENT)
Dept: BEHAVIORAL HEALTH | Facility: CLINIC | Age: 25
End: 2025-02-21
Payer: COMMERCIAL

## 2025-02-21 DIAGNOSIS — F42.2 MIXED OBSESSIONAL THOUGHTS AND ACTS: ICD-10-CM

## 2025-02-21 DIAGNOSIS — F33.1 MODERATE EPISODE OF RECURRENT MAJOR DEPRESSIVE DISORDER: ICD-10-CM

## 2025-02-21 DIAGNOSIS — F41.1 GAD (GENERALIZED ANXIETY DISORDER): ICD-10-CM

## 2025-02-21 DIAGNOSIS — F90.2 ATTENTION DEFICIT HYPERACTIVITY DISORDER (ADHD), COMBINED TYPE: ICD-10-CM

## 2025-02-21 RX ORDER — LISDEXAMFETAMINE DIMESYLATE 30 MG/1
30 CAPSULE ORAL 2 TIMES DAILY
Qty: 60 CAPSULE | Refills: 0 | Status: SHIPPED | OUTPATIENT
Start: 2025-02-21 | End: 2025-03-23

## 2025-02-21 RX ORDER — ATOMOXETINE 80 MG/1
80 CAPSULE ORAL DAILY
Qty: 30 CAPSULE | Refills: 11 | Status: SHIPPED | OUTPATIENT
Start: 2025-02-21 | End: 2026-02-21

## 2025-02-21 RX ORDER — TRAZODONE HYDROCHLORIDE 100 MG/1
200 TABLET ORAL NIGHTLY
Qty: 180 TABLET | Refills: 3 | Status: SHIPPED | OUTPATIENT
Start: 2025-02-21 | End: 2026-02-21

## 2025-02-21 RX ORDER — LISDEXAMFETAMINE DIMESYLATE 30 MG/1
30 CAPSULE ORAL 2 TIMES DAILY
Qty: 60 CAPSULE | Refills: 0 | Status: SHIPPED | OUTPATIENT
Start: 2025-04-22 | End: 2025-05-22

## 2025-02-21 RX ORDER — LISDEXAMFETAMINE DIMESYLATE 30 MG/1
30 CAPSULE ORAL 2 TIMES DAILY
Qty: 60 CAPSULE | Refills: 0 | Status: SHIPPED | OUTPATIENT
Start: 2025-03-23 | End: 2025-04-22

## 2025-02-21 NOTE — PROGRESS NOTES
"Subjective    All Individuals Present: Patient and Provider (Encounter Provider)     ID: Bola Young is a 24 y.o. male with ADHD, CANDELARIO, OCD, and MDD.     Interval History/HPI/PFSH:    Things have been going well mood-wise.    In school, doing physics and calculus    Doing well with new therapist.    Denies SI/HI/AVH.    Medication side effects:  bruxism     Review of Systems  Constitutional: Negative  Psychiatric: Positive for anxiety, fatigue, school difficulties, and sleep disturbance, Negative for depression, irritability, loss of interest in favorite activities, and mood swings  Neurological: Negative   Other: N/A    Objective   There were no vitals taken for this visit.  Wt Readings from Last 4 Encounters:   03/29/21 78.7 kg (173 lb 6.4 oz)   03/11/20 74.4 kg (164 lb)   02/24/20 34.4 kg (75 lb 12.7 oz)       Mental Status Exam  General Appearance: Well groomed, appropriate eye contact.  Attitude/Behavior: Cooperative, conversant, engaged, and with good eye contact.  Motor: Psychomotor agitation. and more rocking  Speech: Normal rate, volume, prosody  Gait/Station: Within normal limits  Mood: \"doing well\"  Affect: Euthymic, full-range and Congruent with mood and topic of conversation  Thought Process: Circumstantial  Thought Associations: No loosening of associations  Thought Content: normal  Sensorium: Alert and oriented to person, place, time and situation  Insight: Intact, as evidenced by awareness of symptom patterns  Judgment:  Some limitations re: increased cannabis usage  Cognition: Attention: Mild impairment in attention, Fund of Knowledge: Good, Language: Naming intact, Orientation: AOx4, Recent memory: intact, and Remote Memory: intact  Testing: N/A., see prior neuropsych testing    Laboratory/Imaging/Diagnostic Tests       Assessment/Plan   Overall Formulation and Differential Diagnosis:  Bola Young is a 24 y.o. male who meets criteria for ADHD, CANDELARIO, MDD, and OCD.    Interval Assessment:  In " the interim, worsening intrusive thoughts and will cross titrate from sertraline to fluvoxamine. Will resume stimulant cautiously. Now back in school, some increased depression with cannabis, now abstaining. Mood overall improved. Able to contract for safety.     Plan:  -continue atomoxetine 80 mg PO daily  -continue Vyvanse to 60 mg PO daily  --continue clonidine 0.3 mg PO QHS  -continue fluvoxamine 150 mg PO BID  -reduce trazodone to 200 mg PO QHS prn insomnia; consider increase in future if benefits depression  -continue CPAP  -continue buspirone to 30 mg PO BID, may discontinue in the future  -continue working with individual psychotherapist, working on finding new DBT therapist  -use SAD lamp  -RTC 8-12 weeks    Risk Assessment:  Imminent Risk of Suicide or Serious Self-Injury: Low Risk -- Risk factors include: Depression and Family history of suicide Protective factors include:Denies current suicidal ideation, Denies history of suicide attempts , Future-oriented talk , Willingness to seek help and support , Cultural and Cheondoism beliefs that discourage suicide and support self-preservation , Access to a variety of clinical interventions , Support through ongoing medical and mental healthcare relationships , Current/history of good response to treatment/meds , Interpersonal relationships and supports, e.g., family, friends, peers, community , and Restricted access to firearms or other lethal means of suicide   Imminent Risk of Violence or Homicide: Low Risk - Risk factors include: No significant risk factors identified on screening. Protective factors include: Lack of known history of harm to others , Lack of known history of violent ideation , Lack of known access to firearms , Affect regulation , and Positive, pro-social family/peer network   Treatment Plan:  There are no recently modified care plans to display for this patient.      Attestation Statements   Topics (in addition those noted above): Diagnostic  impressions, Importance of adherence to treatment , Instructions for treatment , Patient education , Risks and benefits of treatments , Risk factor reduction , and Side effects of medications

## 2025-05-23 ENCOUNTER — APPOINTMENT (OUTPATIENT)
Dept: BEHAVIORAL HEALTH | Facility: CLINIC | Age: 25
End: 2025-05-23
Payer: COMMERCIAL

## 2025-05-23 DIAGNOSIS — F41.1 GAD (GENERALIZED ANXIETY DISORDER): ICD-10-CM

## 2025-05-23 DIAGNOSIS — F33.1 MODERATE EPISODE OF RECURRENT MAJOR DEPRESSIVE DISORDER: ICD-10-CM

## 2025-05-23 DIAGNOSIS — F90.2 ATTENTION DEFICIT HYPERACTIVITY DISORDER (ADHD), COMBINED TYPE: ICD-10-CM

## 2025-05-23 DIAGNOSIS — F42.2 MIXED OBSESSIONAL THOUGHTS AND ACTS: ICD-10-CM

## 2025-05-23 PROCEDURE — 99214 OFFICE O/P EST MOD 30 MIN: CPT | Performed by: STUDENT IN AN ORGANIZED HEALTH CARE EDUCATION/TRAINING PROGRAM

## 2025-05-23 RX ORDER — LISDEXAMFETAMINE DIMESYLATE 30 MG/1
30 CAPSULE ORAL 2 TIMES DAILY
Qty: 60 CAPSULE | Refills: 0 | Status: SHIPPED | OUTPATIENT
Start: 2025-05-23 | End: 2025-06-22

## 2025-05-23 RX ORDER — LISDEXAMFETAMINE DIMESYLATE 30 MG/1
30 CAPSULE ORAL 2 TIMES DAILY
Qty: 60 CAPSULE | Refills: 0 | Status: SHIPPED | OUTPATIENT
Start: 2025-07-22 | End: 2025-08-21

## 2025-05-23 RX ORDER — LISDEXAMFETAMINE DIMESYLATE 30 MG/1
30 CAPSULE ORAL 2 TIMES DAILY
Qty: 60 CAPSULE | Refills: 0 | Status: SHIPPED | OUTPATIENT
Start: 2025-06-22 | End: 2025-07-22

## 2025-05-23 NOTE — PROGRESS NOTES
"Subjective    All Individuals Present: Patient and Provider (Encounter Provider)     ID: Bola Young is a 24 y.o. male with ADHD, CANDELARIO, OCD, and MDD.     Interval History/HPI/PFSH:    Will be pursuing LASIK soon.    Just finished school.    Mood stable and euthymic.    Denies SI/HI/AVH.    Medication side effects: bruxism     Review of Systems  Constitutional: Negative  Psychiatric: Positive for anxiety, fatigue, school difficulties, and sleep disturbance, Negative for depression, irritability, loss of interest in favorite activities, and mood swings  Neurological: Negative   Other: N/A    Objective   There were no vitals taken for this visit.  Wt Readings from Last 4 Encounters:   03/29/21 78.7 kg (173 lb 6.4 oz)   03/11/20 74.4 kg (164 lb)   02/24/20 34.4 kg (75 lb 12.7 oz)       Mental Status Exam  General Appearance: Well groomed, appropriate eye contact.  Attitude/Behavior: Cooperative, conversant, engaged, and with good eye contact.  Motor: Psychomotor agitation. and more rocking  Speech: Normal rate, volume, prosody  Gait/Station: Within normal limits  Mood: \"good\"  Affect: Euthymic, full-range and Congruent with mood and topic of conversation  Thought Process: Circumstantial  Thought Associations: No loosening of associations  Thought Content: normal  Sensorium: Alert and oriented to person, place, time and situation  Insight: Intact, as evidenced by awareness of symptom patterns  Judgment: Some limitations re: increased cannabis usage  Cognition: Attention: Mild impairment in attention, Fund of Knowledge: Good, Language: Naming intact, Orientation: AOx4, Recent memory: intact, and Remote Memory: intact  Testing: N/A., see prior neuropsych testing    Laboratory/Imaging/Diagnostic Tests       Assessment/Plan   Overall Formulation and Differential Diagnosis:  Bola Young is a 24 y.o. male who meets criteria for ADHD, CANDELARIO, MDD, and OCD.    Interval Assessment:  In the interim, worsening intrusive " thoughts and will cross titrate from sertraline to fluvoxamine. Will resume stimulant cautiously. Now back in school, some increased depression with cannabis, now abstaining. Mood overall improved. Able to contract for safety.     Plan:  -continue atomoxetine 80 mg PO daily  -continue Vyvanse to 60 mg PO daily  --continue clonidine 0.3 mg PO QHS  -continue fluvoxamine 150 mg PO BID  -reduce trazodone to 200 mg PO QHS prn insomnia; consider increase in future if benefits depression  -continue CPAP  -continue buspirone to 30 mg PO BID, may discontinue in the future  -continue working with individual psychotherapist, working on finding new DBT therapist  -use SAD [facility]  -RTC 8-12 weeks    Risk Assessment:  Imminent Risk of Suicide or Serious Self-Injury: Low Risk -- Risk factors include: Depression and Family history of suicide Protective factors include:Denies current suicidal ideation, Denies history of suicide attempts , Future-oriented talk , Willingness to seek help and support , Cultural and Cheondoism beliefs that discourage suicide and support self-preservation , Access to a variety of clinical interventions , Support through ongoing medical and mental healthcare relationships , Current/history of good response to treatment/meds , Interpersonal relationships and supports, e.g., family, friends, peers, community , and Restricted access to firearms or other lethal means of suicide   Imminent Risk of Violence or Homicide: Low Risk - Risk factors include: No significant risk factors identified on screening. Protective factors include: Lack of known history of harm to others , Lack of known history of violent ideation , Lack of known access to firearms , Affect regulation , and Positive, pro-social family/peer network   Treatment Plan:  There are no recently modified care plans to display for this patient.      Attestation Statements   Topics (in addition those noted above): Diagnostic impressions, Importance of  adherence to treatment , Instructions for treatment , Patient education , Risks and benefits of treatments , Risk factor reduction , and Side effects of medications

## 2025-05-29 DIAGNOSIS — F90.2 ATTENTION DEFICIT HYPERACTIVITY DISORDER (ADHD), COMBINED TYPE: ICD-10-CM

## 2025-06-04 DIAGNOSIS — F42.2 MIXED OBSESSIONAL THOUGHTS AND ACTS: ICD-10-CM

## 2025-06-04 DIAGNOSIS — F90.2 ATTENTION DEFICIT HYPERACTIVITY DISORDER (ADHD), COMBINED TYPE: ICD-10-CM

## 2025-06-04 DIAGNOSIS — F41.1 GAD (GENERALIZED ANXIETY DISORDER): ICD-10-CM

## 2025-06-04 RX ORDER — CLONIDINE HYDROCHLORIDE 0.2 MG/1
0.2 TABLET ORAL DAILY
Qty: 90 TABLET | Refills: 3 | Status: SHIPPED | OUTPATIENT
Start: 2025-06-04 | End: 2026-06-04

## 2025-06-04 RX ORDER — ATOMOXETINE 80 MG/1
80 CAPSULE ORAL DAILY
Qty: 90 CAPSULE | Refills: 3 | Status: SHIPPED | OUTPATIENT
Start: 2025-06-04 | End: 2026-06-04

## 2025-06-04 RX ORDER — FLUVOXAMINE MALEATE 100 MG/1
150 TABLET, COATED ORAL 2 TIMES DAILY
Qty: 270 TABLET | Refills: 3 | Status: SHIPPED | OUTPATIENT
Start: 2025-06-04

## 2025-06-04 RX ORDER — BUSPIRONE HYDROCHLORIDE 30 MG/1
30 TABLET ORAL 2 TIMES DAILY
Qty: 180 TABLET | Refills: 3 | Status: SHIPPED | OUTPATIENT
Start: 2025-06-04 | End: 2026-06-04

## 2025-08-01 ENCOUNTER — APPOINTMENT (OUTPATIENT)
Dept: BEHAVIORAL HEALTH | Facility: CLINIC | Age: 25
End: 2025-08-01
Payer: COMMERCIAL

## 2025-08-12 RX ORDER — CLONIDINE HYDROCHLORIDE 0.2 MG/1
0.2 TABLET ORAL DAILY
Qty: 90 TABLET | Refills: 3 | OUTPATIENT
Start: 2025-08-12 | End: 2026-08-12